# Patient Record
Sex: FEMALE | Race: WHITE | NOT HISPANIC OR LATINO
[De-identification: names, ages, dates, MRNs, and addresses within clinical notes are randomized per-mention and may not be internally consistent; named-entity substitution may affect disease eponyms.]

---

## 2017-01-31 ENCOUNTER — RX RENEWAL (OUTPATIENT)
Age: 77
End: 2017-01-31

## 2017-03-02 ENCOUNTER — APPOINTMENT (OUTPATIENT)
Dept: OBGYN | Facility: CLINIC | Age: 77
End: 2017-03-02

## 2017-03-02 VITALS
BODY MASS INDEX: 27.49 KG/M2 | DIASTOLIC BLOOD PRESSURE: 70 MMHG | HEIGHT: 65 IN | WEIGHT: 165 LBS | SYSTOLIC BLOOD PRESSURE: 140 MMHG

## 2017-03-02 DIAGNOSIS — H40.059 OCULAR HYPERTENSION, UNSPECIFIED EYE: ICD-10-CM

## 2017-03-02 DIAGNOSIS — Z01.419 ENCOUNTER FOR GYNECOLOGICAL EXAMINATION (GENERAL) (ROUTINE) W/OUT ABNORMAL FINDINGS: ICD-10-CM

## 2017-03-02 DIAGNOSIS — Z12.11 ENCOUNTER FOR SCREENING FOR MALIGNANT NEOPLASM OF COLON: ICD-10-CM

## 2017-03-02 RX ORDER — FEXOFENADINE HYDROCHLORIDE 60 MG/1
60 TABLET, FILM COATED ORAL
Refills: 0 | Status: ACTIVE | COMMUNITY
Start: 2017-03-02

## 2017-03-02 RX ORDER — BRIMONIDINE TARTRATE 1 MG/ML
0.1 SOLUTION/ DROPS OPHTHALMIC
Refills: 0 | Status: ACTIVE | COMMUNITY
Start: 2017-03-02

## 2017-07-14 ENCOUNTER — RESULT REVIEW (OUTPATIENT)
Age: 77
End: 2017-07-14

## 2017-07-14 DIAGNOSIS — R92.8 OTHER ABNORMAL AND INCONCLUSIVE FINDINGS ON DIAGNOSTIC IMAGING OF BREAST: ICD-10-CM

## 2017-08-07 ENCOUNTER — RESULT REVIEW (OUTPATIENT)
Age: 77
End: 2017-08-07

## 2017-12-08 ENCOUNTER — RX RENEWAL (OUTPATIENT)
Age: 77
End: 2017-12-08

## 2018-07-13 ENCOUNTER — MEDICATION RENEWAL (OUTPATIENT)
Age: 78
End: 2018-07-13

## 2018-07-25 ENCOUNTER — OTHER (OUTPATIENT)
Age: 78
End: 2018-07-25

## 2018-10-22 ENCOUNTER — RX RENEWAL (OUTPATIENT)
Age: 78
End: 2018-10-22

## 2018-11-15 ENCOUNTER — APPOINTMENT (OUTPATIENT)
Dept: OBGYN | Facility: CLINIC | Age: 78
End: 2018-11-15

## 2019-01-24 ENCOUNTER — MESSAGE (OUTPATIENT)
Age: 79
End: 2019-01-24

## 2019-01-30 ENCOUNTER — MEDICATION RENEWAL (OUTPATIENT)
Age: 79
End: 2019-01-30

## 2019-02-07 ENCOUNTER — APPOINTMENT (OUTPATIENT)
Dept: OBGYN | Facility: CLINIC | Age: 79
End: 2019-02-07

## 2019-07-02 ENCOUNTER — APPOINTMENT (OUTPATIENT)
Dept: OBGYN | Facility: CLINIC | Age: 79
End: 2019-07-02
Payer: MEDICARE

## 2019-07-02 VITALS
BODY MASS INDEX: 23.3 KG/M2 | WEIGHT: 145 LBS | DIASTOLIC BLOOD PRESSURE: 75 MMHG | SYSTOLIC BLOOD PRESSURE: 131 MMHG | HEIGHT: 66 IN

## 2019-07-02 DIAGNOSIS — K44.9 DIAPHRAGMATIC HERNIA W/OUT OBSTRUCTION OR GANGRENE: ICD-10-CM

## 2019-07-02 DIAGNOSIS — G62.9 POLYNEUROPATHY, UNSPECIFIED: ICD-10-CM

## 2019-07-02 DIAGNOSIS — Z01.419 ENCOUNTER FOR GYNECOLOGICAL EXAMINATION (GENERAL) (ROUTINE) W/OUT ABNORMAL FINDINGS: ICD-10-CM

## 2019-07-02 PROCEDURE — G0101: CPT

## 2019-07-02 NOTE — PHYSICAL EXAM
[Awake] : awake [Alert] : alert [Soft] : soft [Oriented x3] : oriented to person, place, and time [Normal] : vagina [Atrophy] : atrophy [No Bleeding] : there was no active vaginal bleeding [Absent] : absent [Adnexa Absent] : absent bilaterally [RRR, No Murmurs] : RRR, no murmurs [CTAB] : CTAB [Exam Deferred] : was deferred [Acute Distress] : no acute distress [Thyroid Nodule] : no thyroid nodule [LAD] : no lymphadenopathy [Goiter] : no goiter [Mass] : no breast mass [Nipple Discharge] : no nipple discharge [Axillary LAD] : no axillary lymphadenopathy [Tender] : non tender [FreeTextEntry9] : 2018 COLONOSCOPY

## 2019-07-02 NOTE — HISTORY OF PRESENT ILLNESS
[___ Year(s) Ago] : [unfilled] year(s) ago [Fair] : being in fair health [Healthy Diet] : a healthy diet [Lactose Free Diet] : lactose free [___ Servings of Dairy/Day] : [unfilled] servings of dairy foods per day [Can't Exercise-Disability] : ~He/She~ cannot exercise due to disability [Last Mammogram ___] : Last Mammogram was [unfilled] [Last Bone Density ___] : Last bone density studies [unfilled] [Last Colonoscopy ___] : Last colonoscopy [unfilled] [Last Pap ___] : Last cervical pap smear was [unfilled] [Performed Within 5 Years] : lipid profile performed within the past five years [Performed Last Year] : glucose screening performed last year [Uncertain Timing] : uncertain timing of ~his/her~ last thyroid function test [Hypertension] : hypertension [Diabetes] : diabetes [Sedentary Lifestyle] : sedentary lifestyle [FH Cardiovascular Disease] : family history of cardiovascular disease [Hormone Therapy] : hormone therapy [Osteoporosis Risk Factors] : osteoporosis risk factors [Postmenopausal] : is postmenopausal [Pregnancy History] : pregnancy history: [Total Preg ___] : [unfilled] [Full Term ___] : [unfilled] [HPV Vaccine NA Due to Age] : HPV vaccine not available to patient due to age [Definite:  ___ (Date)] : the last menstrual period was [unfilled] [Regular Exercise] : not exercising regularly [Weight Concerns] : no concerns with her weight [High LDL] : no high LDL cholesterol [Low HDL] : no low HDL cholesterol [Stress] : no stress [Obesity] : no obesity [Tobacco Use] : no tobacco use [Previous Breast Cancer] : no previous breast cancer [HPV Positive] : no positive screening for human papilloma virus [Abnormal Cervical Cytology] : no abnormal cervical cytology [Previous Colon Polyps] : no previous colon polyps [In Utero SCOTT Exposure] : no diethylstilbestrol (SCOTT) exposure in utero [Inflammatory Bowel Disease] : no inflammatory bowel disease [Sexually Active] : is not sexually active [de-identified] : Hy/madhav age 51

## 2019-07-02 NOTE — REVIEW OF SYSTEMS
[Sore Throat] : sore throat [Heartburn] : heartburn [Arthralgias] : arthralgias [Joint Pain] : joint pain [Joint Swelling] : joint swelling [Joint Stiffness] : joint stiffness [Cold Intolerance] : intolerance to cold [Heat Intolerance] : intolerance to heat [Nl] : Integumentary [Nasal Discharge] : no nasal discharge [FreeTextEntry1] : numbness and trouble walkin

## 2020-06-29 ENCOUNTER — RX RENEWAL (OUTPATIENT)
Age: 80
End: 2020-06-29

## 2020-06-30 ENCOUNTER — RX RENEWAL (OUTPATIENT)
Age: 80
End: 2020-06-30

## 2020-07-01 ENCOUNTER — RX RENEWAL (OUTPATIENT)
Age: 80
End: 2020-07-01

## 2020-07-07 ENCOUNTER — RX RENEWAL (OUTPATIENT)
Age: 80
End: 2020-07-07

## 2020-12-15 PROBLEM — Z01.419 ENCOUNTER FOR ROUTINE EXAMINATION FOR CONTRACEPTION: Status: RESOLVED | Noted: 2017-03-02 | Resolved: 2020-12-15

## 2021-01-11 ENCOUNTER — APPOINTMENT (OUTPATIENT)
Dept: OBGYN | Facility: CLINIC | Age: 81
End: 2021-01-11
Payer: MEDICARE

## 2021-01-11 DIAGNOSIS — N95.1 MENOPAUSAL AND FEMALE CLIMACTERIC STATES: ICD-10-CM

## 2021-01-11 PROCEDURE — 99442: CPT | Mod: 95

## 2021-01-11 NOTE — HISTORY OF PRESENT ILLNESS
[Home] : at home, [unfilled] , at the time of the visit. [Other Location: e.g. Home (Enter Location, City,State)___] : at [unfilled] [Patient reported mammogram was normal] : Patient reported mammogram was normal [Patient reported PAP Smear was normal] : Patient reported PAP Smear was normal [FreeTextEntry1] : PATIENT "CATCHING UP" ON HER DOCTOR'S VISITS DURING COVID19 PANDEMIC.  ASKING FOR RENEWAL OF COMPOUNDED HT.  STARTED ON HT YEARS AGO.  Risks and benefits of hormone therapy were discussed with the patient including both short-term and long-term risks of heart disease and breast cancer. Family history and lifestyle choices were reviewed in light of the risks and benefits of hormone therapy, including contribution to bone stability and reduction of colon cancer.\par I RECOMMEND REMOVING PROGESTERONE FROM COMPOUND, AS ESTROGEN ALONE THERAPY HAS LOWER CANCER RISK.  \par \par VAGINAL PAP DONE 2016.  ANNUAL EXAM 7/2019.\par \par LYMPHEDEMA THERAPY AT [facility] THROUGHOUT YEAR.\par \par DISCUSSED PRECAUTIONS AGAINST COVID19.  DISCUSSED AND RECOMMENDED VACCINATION.\par  [Mammogramdate] : 11/2020 [PapSmeardate] : 2016 [TextBox_31] : VAGINAL, POST-HYST

## 2021-01-11 NOTE — PLAN
[FreeTextEntry1] : R&B OF HT D/W PATIENT, AND RENEWAL OF HT.  COMPOUNDED SENT TO [facility] IN [place], PROGESTERONE REMOVED.  F/U SYMPTOMS.  ANNUAL EXAM 7/2021, PENDING COVID19 PANDEMIC STATUS.

## 2022-07-27 ENCOUNTER — APPOINTMENT (OUTPATIENT)
Dept: OBGYN | Facility: CLINIC | Age: 82
End: 2022-07-27

## 2022-07-27 VITALS
DIASTOLIC BLOOD PRESSURE: 69 MMHG | BODY MASS INDEX: 28.93 KG/M2 | SYSTOLIC BLOOD PRESSURE: 147 MMHG | WEIGHT: 180 LBS | HEIGHT: 66 IN

## 2022-07-27 DIAGNOSIS — Z12.31 ENCOUNTER FOR SCREENING MAMMOGRAM FOR MALIGNANT NEOPLASM OF BREAST: ICD-10-CM

## 2022-07-27 DIAGNOSIS — R21 RASH AND OTHER NONSPECIFIC SKIN ERUPTION: ICD-10-CM

## 2022-07-27 PROCEDURE — G0101: CPT

## 2022-07-27 PROCEDURE — 99397 PER PM REEVAL EST PAT 65+ YR: CPT | Mod: GY

## 2022-07-27 RX ORDER — POTASSIUM CHLORIDE 1500 MG/1
20 TABLET, EXTENDED RELEASE ORAL
Qty: 30 | Refills: 0 | Status: ACTIVE | COMMUNITY
Start: 2022-02-02

## 2022-07-27 RX ORDER — CLOTRIMAZOLE AND BETAMETHASONE DIPROPIONATE 10; .5 MG/G; MG/G
1-0.05 CREAM TOPICAL TWICE DAILY
Qty: 1 | Refills: 0 | Status: ACTIVE | COMMUNITY
Start: 2022-07-27 | End: 1900-01-01

## 2022-07-27 RX ORDER — FLUTICASONE PROPIONATE 50 UG/1
50 SPRAY, METERED NASAL
Qty: 16 | Refills: 0 | Status: ACTIVE | COMMUNITY
Start: 2022-05-27

## 2022-07-27 RX ORDER — NAPROXEN SODIUM 220 MG
TABLET ORAL
Refills: 0 | Status: DISCONTINUED | COMMUNITY
End: 2022-07-27

## 2022-07-27 NOTE — HISTORY OF PRESENT ILLNESS
[HPV test offered] : HPV test offered [N] : Patient is not sexually active [Y] : Positive pregnancy history [Previously active] : previously active [Men] : men [Mammogramdate] : 11/19/2020 [PapSmeardate] : 02/25/2016 [TextBox_31] : Negative [HPVDate] : 02/25/2016 [TextBox_78] : Negative [LMPDate] : 1991 [PGHxTotal] : 4 [[facility] : 4 [[facility]Living] : 4 [FreeTextEntry1] : 1991

## 2022-07-27 NOTE — DISCUSSION/SUMMARY
[FreeTextEntry1] : RX for mammogram provided to pt\par \par Reviewed the use of coconut oil nightly as pt is opting for natural remedy to vaginal burning/dryness \par \par RX for clotrimazole/betamethasone sent to pharmacy for skin rash, encouraged to limit use of diaper to only wearing when necessary to prevent irritation \par \par We reviewed discontinuing pap smears, reviewed risks/benefits of screening, pt opting to not have pelvic exam or pap smear today\par \par FU as needed

## 2022-07-27 NOTE — PHYSICAL EXAM
[Appropriately responsive] : appropriately responsive [Alert] : alert [No Acute Distress] : no acute distress [Oriented x3] : oriented x3 [Examination Of The Breasts] : a normal appearance [No Masses] : no breast masses were palpable [Labia Majora] : normal [Labia Minora] : normal [Normal] : normal [FreeTextEntry1] : bilateral rash located on upper-inner thighs  [FreeTextEntry4] : pelvic exam deferred

## 2022-08-11 ENCOUNTER — RX RENEWAL (OUTPATIENT)
Age: 82
End: 2022-08-11

## 2022-08-16 ENCOUNTER — NON-APPOINTMENT (OUTPATIENT)
Age: 82
End: 2022-08-16

## 2023-08-10 ENCOUNTER — NON-APPOINTMENT (OUTPATIENT)
Age: 83
End: 2023-08-10